# Patient Record
Sex: MALE | Race: WHITE | Employment: FULL TIME | URBAN - METROPOLITAN AREA
[De-identification: names, ages, dates, MRNs, and addresses within clinical notes are randomized per-mention and may not be internally consistent; named-entity substitution may affect disease eponyms.]

---

## 2017-01-19 ENCOUNTER — OFFICE VISIT (OUTPATIENT)
Dept: INTERNAL MEDICINE CLINIC | Age: 47
End: 2017-01-19

## 2017-01-19 VITALS
BODY MASS INDEX: 45.36 KG/M2 | HEART RATE: 79 BPM | TEMPERATURE: 98.3 F | RESPIRATION RATE: 16 BRPM | DIASTOLIC BLOOD PRESSURE: 85 MMHG | SYSTOLIC BLOOD PRESSURE: 127 MMHG | HEIGHT: 67 IN | WEIGHT: 289 LBS | OXYGEN SATURATION: 97 %

## 2017-01-19 DIAGNOSIS — E11.65 TYPE 2 DIABETES MELLITUS WITH HYPERGLYCEMIA, WITHOUT LONG-TERM CURRENT USE OF INSULIN (HCC): Primary | Chronic | ICD-10-CM

## 2017-01-19 DIAGNOSIS — E78.2 MIXED HYPERLIPIDEMIA: Chronic | ICD-10-CM

## 2017-01-19 RX ORDER — PRAVASTATIN SODIUM 20 MG/1
20 TABLET ORAL
Qty: 30 TAB | Refills: 6 | Status: SHIPPED | OUTPATIENT
Start: 2017-01-19

## 2017-01-19 RX ORDER — METFORMIN HYDROCHLORIDE 500 MG/1
500 TABLET ORAL 2 TIMES DAILY WITH MEALS
Qty: 120 TAB | Refills: 5 | Status: SHIPPED | OUTPATIENT
Start: 2017-01-19 | End: 2017-02-14 | Stop reason: SDUPTHER

## 2017-01-19 RX ORDER — METFORMIN HYDROCHLORIDE 500 MG/1
TABLET ORAL 2 TIMES DAILY WITH MEALS
COMMUNITY
End: 2017-01-19 | Stop reason: SDUPTHER

## 2017-01-19 NOTE — PATIENT INSTRUCTIONS
Diabetes Foot Health: Care Instructions  Your Care Instructions    When you have diabetes, your feet need extra care and attention. Diabetes can damage the nerve endings and blood vessels in your feet, making you less likely to notice when your feet are injured. Diabetes also limits your body's ability to fight infection and get blood to areas that need it. If you get a minor foot injury, it could become an ulcer or a serious infection. With good foot care, you can prevent most of these problems. Caring for your feet can be quick and easy. Most of the care can be done when you are bathing or getting ready for bed. Follow-up care is a key part of your treatment and safety. Be sure to make and go to all appointments, and call your doctor if you are having problems. Its also a good idea to know your test results and keep a list of the medicines you take. How can you care for yourself at home? · Keep your blood sugar close to normal by watching what and how much you eat, monitoring blood sugar, taking medicines if prescribed, and getting regular exercise. · Do not smoke. Smoking affects blood flow and can make foot problems worse. If you need help quitting, talk to your doctor about stop-smoking programs and medicines. These can increase your chances of quitting for good. · Eat a diet that is low in fats. High fat intake can cause fat to build up in your blood vessels and decrease blood flow. · Inspect your feet daily for blisters, cuts, cracks, or sores. If you cannot see well, use a mirror or have someone help you. · Take care of your feet:  [facility] [place] AUTHORITY your feet every day. Use warm (not hot) water. Check the water temperature with your wrists or other part of your body, not your feet. ¨ Dry your feet well. Pat them dry. Do not rub the skin on your feet too hard. Dry well between your toes. If the skin on your feet stays moist, bacteria or a fungus can grow, which can lead to infection.   ¨ Keep your skin soft. Use moisturizing skin cream to keep the skin on your feet soft and prevent calluses and cracks. But do not put the cream between your toes, and stop using any cream that causes a rash. ¨ Clean underneath your toenails carefully. Do not use a sharp object to clean underneath your toenails. Use the blunt end of a nail file or other rounded tool. ¨ Trim and file your toenails straight across to prevent ingrown toenails. Use a nail clipper, not scissors. Use an emery board to smooth the edges. · Change socks daily. Socks without seams are best, because seams often rub the feet. You can find socks for people with diabetes from specialty catalogs. · Look inside your shoes every day for things like gravel or torn linings, which could cause blisters or sores. · Buy shoes that fit well:  ¨ Look for shoes that have plenty of space around the toes. This helps prevent bunions and blisters. ¨ Try on shoes while wearing the kind of socks you will usually wear with the shoes. ¨ Avoid plastic shoes. They may rub your feet and cause blisters. Good shoes should be made of materials that are flexible and breathable, such as leather or cloth. ¨ Break in new shoes slowly by wearing them for no more than an hour a day for several days. Take extra time to check your feet for red areas, blisters, or other problems after you wear new shoes. · Do not go barefoot. Do not wear sandals, and do not wear shoes with very thin soles. Thin soles are easy to puncture. They also do not protect your feet from hot pavement or cold weather. · Have your doctor check your feet during each visit. If you have a foot problem, see your doctor. Do not try to treat an early foot problem at home. Home remedies or treatments that you can buy without a prescription (such as corn removers) can be harmful. · Always get early treatment for foot problems. A minor irritation can lead to a major problem if not properly cared for early.   When should you call for help? Call your doctor now or seek immediate medical care if:  · You have a foot sore, an ulcer or break in the skin that is not healing after 4 days, bleeding corns or calluses, or an ingrown toenail. · You have blue or black areas, which can mean bruising or blood flow problems. · You have peeling skin or tiny blisters between your toes or cracking or oozing of the skin. · You have a fever for more than 24 hours and a foot sore. · You have new numbness or tingling in your feet that does not go away after you move your feet or change positions. · You have unexplained or unusual swelling of the foot or ankle. Watch closely for changes in your health, and be sure to contact your doctor if:  · You cannot do proper foot care. Where can you learn more? Go to http://sussy-warren.info/. Enter A739 in the search box to learn more about \"Diabetes Foot Health: Care Instructions. \"  Current as of: May 23, 2016  Content Version: 11.1  © 7801-0897 [employer]. Care instructions adapted under license by [employer] (which disclaims liability or warranty for this information). If you have questions about a medical condition or this instruction, always ask your healthcare professional. [address] any warranty or liability for your use of this information. Come back to have hgba1c checked. Do NOT have to be fasting.

## 2017-01-19 NOTE — PROGRESS NOTES
Reviewed record in preparation for visit and have obtained necessary documentation. Identified pt with two pt identifiers(name and ). Chief Complaint   Patient presents with    Diabetes     follow up    Cholesterol Problem       Health Maintenance Due   Topic Date Due    FOOT EXAM Q1  1981    EYE EXAM RETINAL OR DILATED Q1  1981    Pneumococcal 19-64 Medium Risk (1 of 1 - PPSV23) 1990       Mr. Cathi Traylor has a reminder for a \"due or due soon\" health maintenance. I have asked that he discuss health maintenance topic(s) due with His  primary care provider. Coordination of Care Questionnaire:  :     1) Have you been to an emergency room, urgent care clinic since your last visit? no   Hospitalized since your last visit? no             2) Have you seen or consulted any other health care providers outside of [address] [place] [address] since your last visit? no  (Include any pap smears or colon screenings in this section.)      Patient is accompanied by self I have received verbal consent from David Mcrae to discuss any/all medical information while they are present in the room.

## 2017-01-19 NOTE — MR AVS SNAPSHOT
Visit Information Date & Time Provider Department Dept. Phone Encounter #  
 1/19/2017  4:00 PM Swetha Harris, [address] [phone] [id number] Follow-up Instructions Return in about 3 months (around 4/19/2017). Upcoming Health Maintenance Date Due  
 EYE EXAM RETINAL OR DILATED Q1 2/19/1981 Pneumococcal 19-64 Medium Risk (1 of 1 - PPSV23) 2/19/1990 HEMOGLOBIN A1C Q6M 3/18/2017 MICROALBUMIN Q1 10/27/2017 LIPID PANEL Q1 10/27/2017 FOOT EXAM Q1 1/19/2018 DTaP/Tdap/Td series (2 - Td) 10/18/2026 Allergies as of 1/19/2017  Review Complete On: 1/19/2017 By: John Mathews III, DO No Known Allergies Current Immunizations  Reviewed on 10/18/2016 Name Date Tdap 10/18/2016  4:26 PM  
  
 Not reviewed this visit You Were Diagnosed With   
  
 Codes Comments Type 2 diabetes mellitus with hyperglycemia, without long-term current use of insulin (HCC)    -  Primary ICD-10-CM: E11.65 ICD-9-CM: 250.00, 790.29 Mixed hyperlipidemia     ICD-10-CM: E78.2 ICD-9-CM: 272.2 Vitals BP Pulse Temp Resp Height(growth percentile) Weight(growth percentile) 127/85 (BP 1 Location: Right arm, BP Patient Position: Sitting) 79 98.3 °F (36.8 °C) (Oral) 16 5' 7\" (1.702 m) 289 lb (131.1 kg) SpO2 BMI Smoking Status 97% 45.26 kg/m2 Current Every Day Smoker Vitals History BMI and BSA Data Body Mass Index Body Surface Area  
 45.26 kg/m 2 2.49 m 2 Preferred Pharmacy Pharmacy Name Phone [phone] [facility] [phone] Your Updated Medication List  
  
   
This list is accurate as of: 1/19/17  5:13 PM.  Always use your most recent med list.  
  
  
  
  
 ibuprofen 600 mg tablet Commonly known as:  MOTRIN Take 1 Tab by mouth three (3) times daily (with meals). metFORMIN 500 mg tablet Commonly known as:  GLUCOPHAGE Take 1 Tab by mouth two (2) times daily (with meals). Indications: takes 2 tablets twice daily  
  
 pravastatin 20 mg tablet Commonly known as:  PRAVACHOL Take 1 Tab by mouth nightly. Prescriptions Sent to Pharmacy Refills  
 metFORMIN (GLUCOPHAGE) 500 mg tablet 5 Sig: Take 1 Tab by mouth two (2) times daily (with meals). Indications: takes 2 tablets twice daily Class: Normal  
 Pharmacy: [address] Ph #: [phone] Route: Oral  
 pravastatin (PRAVACHOL) 20 mg tablet 6 Sig: Take 1 Tab by mouth nightly. Class: Normal  
 Pharmacy: [address] Ph #: [phone] Route: Oral  
  
We Performed the Following HEMOGLOBIN A1C WITH EAG [29211 CPT(R)] Follow-up Instructions Return in about 3 months (around 4/19/2017). Patient Instructions Diabetes Foot Health: Care Instructions Your Care Instructions When you have diabetes, your feet need extra care and attention. Diabetes can damage the nerve endings and blood vessels in your feet, making you less likely to notice when your feet are injured. Diabetes also limits your body's ability to fight infection and get blood to areas that need it. If you get a minor foot injury, it could become an ulcer or a serious infection. With good foot care, you can prevent most of these problems. Caring for your feet can be quick and easy. Most of the care can be done when you are bathing or getting ready for bed. Follow-up care is a key part of your treatment and safety. Be sure to make and go to all appointments, and call your doctor if you are having problems. Its also a good idea to know your test results and keep a list of the medicines you take. How can you care for yourself at home? · Keep your blood sugar close to normal by watching what and how much you eat, monitoring blood sugar, taking medicines if prescribed, and getting regular exercise. · Do not smoke. Smoking affects blood flow and can make foot problems worse. If you need help quitting, talk to your doctor about stop-smoking programs and medicines. These can increase your chances of quitting for good. · Eat a diet that is low in fats. High fat intake can cause fat to build up in your blood vessels and decrease blood flow. · Inspect your feet daily for blisters, cuts, cracks, or sores. If you cannot see well, use a mirror or have someone help you. · Take care of your feet: 
[facility] AUTHORITY your feet every day. Use warm (not hot) water. Check the water temperature with your wrists or other part of your body, not your feet. ¨ Dry your feet well. Pat them dry. Do not rub the skin on your feet too hard. Dry well between your toes. If the skin on your feet stays moist, bacteria or a fungus can grow, which can lead to infection. ¨ Keep your skin soft. Use moisturizing skin cream to keep the skin on your feet soft and prevent calluses and cracks. But do not put the cream between your toes, and stop using any cream that causes a rash. ¨ Clean underneath your toenails carefully. Do not use a sharp object to clean underneath your toenails. Use the blunt end of a nail file or other rounded tool. ¨ Trim and file your toenails straight across to prevent ingrown toenails. Use a nail clipper, not scissors. Use an emery board to smooth the edges. · Change socks daily. Socks without seams are best, because seams often rub the feet. You can find socks for people with diabetes from specialty catalogs. · Look inside your shoes every day for things like gravel or torn linings, which could cause blisters or sores. · Buy shoes that fit well: 
¨ Look for shoes that have plenty of space around the toes. This helps prevent bunions and blisters. ¨ Try on shoes while wearing the kind of socks you will usually wear with the shoes. ¨ Avoid plastic shoes. They may rub your feet and cause blisters. Good shoes should be made of materials that are flexible and breathable, such as leather or cloth. ¨ Break in new shoes slowly by wearing them for no more than an hour a day for several days. Take extra time to check your feet for red areas, blisters, or other problems after you wear new shoes. · Do not go barefoot. Do not wear sandals, and do not wear shoes with very thin soles. Thin soles are easy to puncture. They also do not protect your feet from hot pavement or cold weather. · Have your doctor check your feet during each visit. If you have a foot problem, see your doctor. Do not try to treat an early foot problem at home. Home remedies or treatments that you can buy without a prescription (such as corn removers) can be harmful. · Always get early treatment for foot problems. A minor irritation can lead to a major problem if not properly cared for early. When should you call for help? Call your doctor now or seek immediate medical care if: 
· You have a foot sore, an ulcer or break in the skin that is not healing after 4 days, bleeding corns or calluses, or an ingrown toenail. · You have blue or black areas, which can mean bruising or blood flow problems. · You have peeling skin or tiny blisters between your toes or cracking or oozing of the skin. · You have a fever for more than 24 hours and a foot sore. · You have new numbness or tingling in your feet that does not go away after you move your feet or change positions. · You have unexplained or unusual swelling of the foot or ankle. Watch closely for changes in your health, and be sure to contact your doctor if: 
· You cannot do proper foot care. Where can you learn more? Go to http://sussy-warren.info/.  
Enter A748 in the search box to learn more about \"Diabetes Foot Health: Care Instructions. \" Current as of: May 23, 2016 Content Version: 11.1 © 7995-7835 [employer]. Care instructions adapted under license by [employer] (which disclaims liability or warranty for this information). If you have questions about a medical condition or this instruction, always ask your healthcare professional. [address] any warranty or liability for your use of this information. Come back to have hgba1c checked. Do NOT have to be fasting. Introducing [facility]! Dee Foley introduces [employer] patient portal. Now you can access parts of your medical record, email your doctor's office, and request medication refills online. 1. In your internet browser, go to [email or web address]. [employer] for Google [email or web address]/[employer] 2. Click on the First Time User? Click Here link in the Sign In box. You will see the New Member Sign Up page. 3. Enter your [employer] Access Code exactly as it appears below. You will not need to use this code after youve completed the sign-up process. If you do not sign up before the expiration date, you must request a new code. · [employer] Access Code: [email or web address] Expires: 4/19/2017  5:13 PM 
 
4. Enter the last four digits of your Social Security Number (xxxx) and Date of Birth (mm/dd/yyyy) as indicated and click Submit. You will be taken to the next sign-up page. 5. Create a [employer] ID. This will be your [employer] login ID and cannot be changed, so think of one that is secure and easy to remember. 6. Create a [employer] password. You can change your password at any time. 7. Enter your Password Reset Question and Answer. This can be used at a later time if you forget your password. 8. Enter your e-mail address. You will receive e-mail notification when new information is available in [address]. 9. Click Sign Up. You can now view and download portions of your medical record. 10. Click the Download Summary menu link to download a portable copy of your medical information. If you have questions, please visit the Frequently Asked Questions section of the [employer] website. Remember, [employer] is NOT to be used for urgent needs. For medical emergencies, dial 911. Now available from your iPhone and Android! Please provide this summary of care documentation to your next provider. Your primary care clinician is listed as Andrew Solorzano If you have any questions after today's visit, please call [phone].

## 2017-01-19 NOTE — PROGRESS NOTES
Darlin Kapoor is a 40 y.o. male who presents for evaluation of routine follow up. Last seen by me oct 18. Doing ok since then, though he has gained 13 lbs. Did not tolerate lipitor, caused diffuse myalgias that resolved about a week after stopping it. ROS:  Constitutional: negative for fevers, chills, anorexia and weight loss  Eyes:   negative for visual disturbance and irritation  ENT:   negative for tinnitus,sore throat,nasal congestion,ear pain,hoarseness  Respiratory:  negative for cough, hemoptysis, dyspnea,wheezing  CV:   negative for chest pain, palpitations, lower extremity edema  GI:   negative for nausea, vomiting, diarrhea, abdominal pain,melena  Genitourinary: negative for frequency, dysuria and hematuria  Musculoskel: negative for myalgias, arthralgias, back pain, muscle weakness, joint pain  Neurological:  negative for headaches, dizziness, focal weakness, numbness  Psychiatric:     Negative for depression or anxiety      Past Medical History   Diagnosis Date    Diabetes ([facility] [address]      on metformin for 6 months    Excessive daytime sleepiness      probable sleep apnea    H/O abscess of skin and subcutaneous tissue     Obesity     Psoriasis        Past Surgical History   Procedure Laterality Date    Hx cyst incision and drainage Left 09/14/2016     left perineal abscess    Hx other surgical       I & D of perineal abscess 9/14/16       Family History   Problem Relation Age of Onset    Cancer Mother      lung    No Known Problems Father     Diabetes Maternal Grandmother     Heart Disease Maternal Grandfather        Social History     Social History    Marital status:      Spouse name: N/A    Number of children: N/A    Years of education: N/A     Occupational History    Not on file.      Social History Main Topics    Smoking status: Current Every Day Smoker     Packs/day: 0.75     Years: 30.00     Types: Cigarettes    Smokeless tobacco: Never Used    Alcohol use No    Drug use: No    Sexual activity: Yes     Partners: Female     Birth control/ protection: None     Other Topics Concern    Not on file     Social History Narrative            Visit Vitals    /85 (BP 1 Location: Right arm, BP Patient Position: Sitting)    Pulse 79    Temp 98.3 °F (36.8 °C) (Oral)    Resp 16    Ht 5' 7\" (1.702 m)    Wt 289 lb (131.1 kg)    SpO2 97%    BMI 45.26 kg/m2       Physical Examination:   General - Well appearing male  HEENT - PERRL, TM no erythema/opacification, normal nasal turbinates, no oropharyngeal erythema or exudate, MMM  Neck - supple, no bruits, no thyroidomegaly, no lymphadenopathy  Pulm - clear to auscultation bilaterally  Cardio - RRR, normal S1 S2, no murmur  Abd - soft, nontender, no masses, no HSM  Extrem - no edema, +2 distal pulses  Neuro-  No focal deficits, CN intact           Diabetic foot exam performed by Mike Venegas III, DO     Measurement  Response Nurse Comment Physician Comment   Monofilament  R - normal sensation with micro filament  L - normal sensation with micro filament     Pulse DP R - present  L - present     Pulse TP R - present  L - present     Structural deformity R - None  L - None     Skin Integrity / Deformity R - None  L - None        Reviewed by:              Assessment/Plan:    1.  Dm, type 2--check a1c, continue with glucophage  2.  hyperlipids--did not tolerate lipitor, try pravachol  3. Morbid obesity--working on weight loss  4. Routine adult health maintenance--declined pneumovax today. Will make appt for eyes.     rtc 3 months        Mike Venegas III, DO

## 2017-01-19 NOTE — LETTER
1/30/2017 2:33 PM 
 
Mr. Graciela Rosales [facility] [place] [address] [id number] Dear Graciela Rosales: Please find your most recent results below. Resulted Orders HEMOGLOBIN A1C WITH EAG Result Value Ref Range Hemoglobin A1c 8.4 (H) 4.8 - 5.6 % Comment:  
            Pre-diabetes: 5.7 - 6.4 Diabetes: >6.4 Glycemic control for adults with diabetes: <7.0 Estimated average glucose 194 mg/dL Narrative Performed at:  [address]  [id number] : Bee Fisher MD, Phone:  [phone] RECOMMENDATIONS: 
Making progress, diabetes test (a1c) improved to 8.4.  Continue working on cutting back on carbs/starches and taking glucophage. Please call me if you have any questions: [phone] Sincerely, Ben Watts III, DO

## 2017-01-26 ENCOUNTER — APPOINTMENT (OUTPATIENT)
Dept: INTERNAL MEDICINE CLINIC | Age: 47
End: 2017-01-26

## 2017-01-27 LAB
EST. AVERAGE GLUCOSE BLD GHB EST-MCNC: 194 MG/DL
HBA1C MFR BLD: 8.4 % (ref 4.8–5.6)

## 2017-01-27 NOTE — PROGRESS NOTES
Making progress, diabetes test (a1c) improved to 8.4. Continue working on cutting back on carbs/starches and taking glucophage.

## 2017-01-30 ENCOUNTER — TELEPHONE (OUTPATIENT)
Dept: INTERNAL MEDICINE CLINIC | Age: 47
End: 2017-01-30

## 2017-01-30 NOTE — PROGRESS NOTES
I attempted to contact patient. Pt did not answer. Unable to leave . Patient's results have been mailed to patient's home.

## 2017-02-14 RX ORDER — METFORMIN HYDROCHLORIDE 500 MG/1
500 TABLET ORAL 2 TIMES DAILY WITH MEALS
Qty: 180 TAB | Refills: 3 | Status: SHIPPED | OUTPATIENT
Start: 2017-02-14

## 2017-04-09 ENCOUNTER — DOCUMENTATION ONLY (OUTPATIENT)
Dept: OPHTHALMOLOGY | Age: 47
End: 2017-04-09

## 2017-04-09 DIAGNOSIS — I65.29 STENOSIS OF CAROTID ARTERY, UNSPECIFIED LATERALITY: Primary | ICD-10-CM

## 2017-04-09 NOTE — PROGRESS NOTES
Dear Dr. Quang Henderson,  Thank you for your kind referral of Mr. Roxie Junior for his diabetic eye exam.  He had peripheral retinal hemorrhages which can be a sign of ocular ischemia and carotid stenosis. He notes he does not take his cholesterol medication as his legs hurt. If appropriate, I would recommend a carotid doppler for his patient. This could also be due to diabetes however he had no central retinal hemorrhages. Please find my notes below for your review. Thank you for allowing me to share in the care of your patient. Sincerely,  Lashonda Hernandez MD    Outpatient Ophthalmology Note    Date of Service: April 7, 2017    Facility: [facility] [place], [facility]'[address], [address] [place] [address] Way  Phone: [phone]; Fax: [phone]    Ophthalmologist: Lashonda Hernandez MD    SUBJECTIVE    CC: \"Diabetic eye exam/Refraction\"    HPI: Deborah Linder is a 56 yr old , Male who presents for a diabetic eye exam and refraction as referred by Dr. Trinity Courtney. Cannot recall last HgA1c Level. Fasting Blooding Glucose Level: Unsure last checked approximately 2 months ago. Has noticed within the past year fluctuations in his vision which correlate with his sugar levels. Noticebly more often in the morning times when blood glucose level is elevated. Denies any pain, itching, burning, or dryness. Patient admits to frequent eye rubbing. Last Glasses Rx approximately 89 years old, Last eye exam 3 years ago.     Eye Review of Systems:   Flashing Lights - Negative  Floaters - Positive   Eye Pain - Negative  History of eye surgery - Negative  Loss of vision - Negative  Diabetes - Positive Dx 2016  Glaucoma - Family Hx    Past Medical Hx:   hyperlipidemia  Diabetes Dx: 2016-Oral Medication/Diet Controlled  Hearing Loss    Past Surgical Hx:   None    Medications:   MetFORMIN HCl 1000 MG Oral Tablet    Allergies:   NKDA    Social Hx:   55 y/o [place] Male  , Employed in [place] [employer] at [employer]  Tobacco Use: 1/2 ppd x 27 yrs  ETOH use: 1 drink wkly  No illicit drug use    Family Hx:   Maternal Aunts: Hertzsprung disease  Maternal/Paternal: Diabetes  Maternal Grandmother: Glaucoma     OBJECTIVE    Visual Acuity:  Distance (best corrected)  OD 20/25+1, pinhole 20/20  OS 20/20    Wearing Rx:  OD -3.00 +2.50 x013  Add: +1.00  OS -3.50 +2.25 x002  Add: +1.00    Manifest Refraction:  OD -3.50 +2.00 x 015  =20/20        Add: +2.00  =20/20  OS  -3.50 +2.50 x 002  =20/20-1    Add: +2.00  =20/20  Progressive Add: +2.25    Confrontation Visual Fields  OD: Full to count fingers   OS: Full to count fingers     Motility:   OD - full in all fields of gaze  OS - full in all fields of gaze    Pupils:   OD: 4 mm in dark to 2 mm; no rAPD  OS: 4 mm in dark to 2 mm; no rAPD    IOP (Ta): OD 15 mmHg,                    OS 14 mmHg  at 1:57 pm      Gonioscopy:  OD: open to  deg  OS: open to  deg     Slit Lamp Exam:  External: Normal. No ptosis, no rash, no facial droop  Lids/lashes: Demodex blepharitis  Conjunctiva: Clear OU  Cornea: Clear OU  Anterior Segment: Clear OU  Iris: normal OU  Lens: Clear OU  Anterior Vitreous: Clear OU. Dilated Fundus Exam:   Dilating Drops (M) instilled at 2:00 pm     Vitreous:   OD: clear  OS: clear    Optic nerve:   OD - c/d 0.2, healthy, pink, no disc edema  OS - c/d 0.2, healthy, pink, no disc edema    Macula:  OD - normal fovea, no drusen, no macular edema, no hemorrhages  OS  - normal fovea, no drusen, no macular edema, no hemorrhages    Vessels:   OD: normal A/V ratio  OS: normal A/V ratio    Periphery:    OD: No retinal detachment/holes/tears, 1 Large dot-blot hemorrhage at 9 o'clock, small hemorrhage at 4-5 o'clock  OS: No retinal detachment/holes/tears, 1 small hemorrhage at 5 o'clock    ASSESSMENT    1. Diabetes mellitus (dx 2016) - NIDDM   - peripheral retinal hemorrhages both eyes, possibly diabetic retinopathy or sign of ocular ischemia.   - no macular edema  - HbA1c = 8.4 (1/19/2017)    2. Demodex Blepharitis  - 1 Cliridex pad sample provided to patient  - offered antibiotic ointment to help smother mites to use at bedtime- pt declines at this time  - discussed blephex treatment    3. High astigmatism  - recommend avoiding eye rubbing  - no keratoconus on topography    4. Refractive error  - dispense new glasses Rx as requested    Diagnoses attached to this encounter:  (Q28.1250) Type 2 diabetes mellitus with mild nonproliferative diabetic retinopathy without macular edema, bilateral  (H35.63) Retinal hemorrhage, bilateral  (H01.004) Unspecified blepharitis left upper eyelid  (H52.203) Unspecified astigmatism, bilateral    PLAN    Demodex blepharitis    Signs and Symptoms: eyelid itching, crusting, boils on eyelids, red bumps on skin, dry eyes, burning eyes, conjunctivitis, eye irritation, intermittent blurred vision     Treatment Protocol:  1) Warm compresses 10 min 2x per day  2) Wash faces and eyelids with face wash  3) Apply tea tree oil treatment either with Cliradex lid wipes (approved for use on eye) and let air dry and do not rinse off OR shampoo (diluted 50% with water) - gentle scrub to eyelashes for 1 minute then rinse off. Perform this 2x per day for 6 weeks for full treatment  4) If not improved, consider Soolantra (ivermectin/prescription) anti-mite cream or Avenova (prescription) spray to help. Apply small amount to eyelid (do NOT get into eyes) and other affected areas 1x per day for 10-20 days. We will need to get this approved thru your insurance. 5) Other symptomatic control with tears as needed - no more than 4x per day    Diabetes mellitus  - discussed eye disease with uncontrolled diabetes including swelling of the retina (macular edema), bleeding and cholesterol deposits (mild, moderate or severe) and new blood vessel growth in the retina (proliferative changes) as well as risk of cataracts and glaucoma. - blood sugar control and monitoring. Follow up in 6 months to recheck hemorrhages. Recommend patient take cholesterol medications (notes he was high and didn't take meds as his legs hurt) and consider getting carotid doppler (notes he had this recently but unable to find this in EPIC).

## 2018-06-18 ENCOUNTER — OFFICE VISIT (OUTPATIENT)
Dept: INTERNAL MEDICINE CLINIC | Age: 48
End: 2018-06-18

## 2018-06-18 VITALS
HEIGHT: 67 IN | SYSTOLIC BLOOD PRESSURE: 142 MMHG | BODY MASS INDEX: 44.26 KG/M2 | HEART RATE: 99 BPM | DIASTOLIC BLOOD PRESSURE: 85 MMHG | OXYGEN SATURATION: 98 % | WEIGHT: 282 LBS | TEMPERATURE: 98.1 F

## 2018-06-18 DIAGNOSIS — N40.1 BENIGN PROSTATIC HYPERPLASIA WITH WEAK URINARY STREAM: Chronic | ICD-10-CM

## 2018-06-18 DIAGNOSIS — E66.01 OBESITY, MORBID (HCC): ICD-10-CM

## 2018-06-18 DIAGNOSIS — I10 ESSENTIAL HYPERTENSION: Chronic | ICD-10-CM

## 2018-06-18 DIAGNOSIS — E11.65 TYPE 2 DIABETES MELLITUS WITH HYPERGLYCEMIA, WITHOUT LONG-TERM CURRENT USE OF INSULIN (HCC): Chronic | ICD-10-CM

## 2018-06-18 DIAGNOSIS — R39.12 BENIGN PROSTATIC HYPERPLASIA WITH WEAK URINARY STREAM: Chronic | ICD-10-CM

## 2018-06-18 DIAGNOSIS — E11.69 HYPERLIPIDEMIA ASSOCIATED WITH TYPE 2 DIABETES MELLITUS (HCC): Chronic | ICD-10-CM

## 2018-06-18 DIAGNOSIS — R00.2 PALPITATIONS: Primary | ICD-10-CM

## 2018-06-18 DIAGNOSIS — E78.5 HYPERLIPIDEMIA ASSOCIATED WITH TYPE 2 DIABETES MELLITUS (HCC): Chronic | ICD-10-CM

## 2018-06-18 PROBLEM — E78.2 MIXED HYPERLIPIDEMIA: Chronic | Status: RESOLVED | Noted: 2017-01-19 | Resolved: 2018-06-18

## 2018-06-18 RX ORDER — ASPIRIN 81 MG/1
81 TABLET ORAL DAILY
Qty: 90 TAB | Refills: 3
Start: 2018-06-18

## 2018-06-18 NOTE — PROGRESS NOTES
Yanet Pierre is a 53 y.o. male who presents for evaluation of palpitations. Last seen by me jan 19, 2017. Palpitations have been on/off now for about 5 months, but increased in frequency and duration of late. Some occurs with activity, other times when he is on computer playing video games. Denies any chest pain, pressure or other symptoms. Admits to drinking lots of coffee each morning, as well as having a stressful job, works at [employer].       ROS:  Constitutional: negative for fevers, chills, anorexia and weight loss  Eyes:   negative for visual disturbance and irritation  ENT:   negative for tinnitus,sore throat,nasal congestion,ear pain,hoarseness  Respiratory:  negative for cough, hemoptysis, dyspnea,wheezing  CV:   negative for chest pain, palpitations, lower extremity edema  GI:   negative for nausea, vomiting, diarrhea, abdominal pain,melena  Genitourinary: negative for frequency, dysuria and hematuria  Musculoskel: negative for myalgias, arthralgias, back pain, muscle weakness, joint pain  Neurological:  negative for headaches, dizziness, focal weakness, numbness  Psychiatric:     Negative for depression or anxiety      Past Medical History:   Diagnosis Date    Diabetes ([facility] [address]     on metformin for 6 months    Excessive daytime sleepiness     probable sleep apnea    H/O abscess of skin and subcutaneous tissue     Obesity     Psoriasis        Past Surgical History:   Procedure Laterality Date    HX CYST INCISION AND DRAINAGE Left 09/14/2016    left perineal abscess    HX OTHER SURGICAL      I & D of perineal abscess 9/14/16       Family History   Problem Relation Age of Onset    Cancer Mother      lung    No Known Problems Father     Diabetes Maternal Grandmother     Heart Disease Maternal Grandfather        Social History     Social History    Marital status:      Spouse name: N/A    Number of children: N/A    Years of education: N/A     Occupational History  Not on file. Social History Main Topics    Smoking status: Current Every Day Smoker     Packs/day: 0.75     Years: 30.00     Types: Cigarettes    Smokeless tobacco: Never Used    Alcohol use No    Drug use: Yes     Special: Prescription, OTC    Sexual activity: Yes     Partners: Female     Birth control/ protection: None     Other Topics Concern    Not on file     Social History Narrative            Visit Vitals    /85 (BP 1 Location: Left arm, BP Patient Position: Sitting)    Pulse 99    Temp 98.1 °F (36.7 °C) (Oral)    Ht 5' 7\" (1.702 m)    Wt 282 lb (127.9 kg)    SpO2 98%    BMI 44.17 kg/m2       Physical Examination:   General - Well appearing male  HEENT - PERRL, TM no erythema/opacification, normal nasal turbinates, no oropharyngeal erythema or exudate, MMM  Neck - supple, no bruits, no thyroidomegaly, no lymphadenopathy  Pulm - clear to auscultation bilaterally  Cardio - RRR, normal S1 S2, no murmur  Abd - soft, nontender, no masses, no HSM  Extrem - no edema, +2 distal pulses  Neuro-  No focal deficits, CN intact     Assessment/Plan:    1. Heart palpitations--ecg with nsr today. Check tsh, cbc, cmp. Referral to cardio, dr Jody Mitchell for possible holter monitor. Also advised to cut back on caffeine intake. Suggested starting 81 mg asa daily. 2.  Dm, type 2--last a1c 8.4, on glucophage. Check a1c, urine micro  3. Tobacco abuse--not ready to quit at this time  4. Elevated bp--not currently on any meds  5.  hyperlipids--last , stopped taking his pravachol.     rtc 3 months        Noe Tapia III, DO

## 2018-06-18 NOTE — MR AVS SNAPSHOT
[address] [facility] [address]. 
[phone] Patient: Teo Hassan MRN: [id number] RER:[birth year] Visit Information Date & Time Provider Department Dept. Phone Encounter #  
 6/18/2018  2:15 PM Mikaylasubha Escobedo, [address] [facility] [address]  [id number] Follow-up Instructions Return in about 3 months (around 9/18/2018). Upcoming Health Maintenance Date Due  
 EYE EXAM RETINAL OR DILATED Q1 2/19/1981 HEMOGLOBIN A1C Q6M 7/26/2017 MICROALBUMIN Q1 10/27/2017 LIPID PANEL Q1 10/27/2017 FOOT EXAM Q1 1/19/2018 Influenza Age 5 to Adult 8/1/2018 DTaP/Tdap/Td series (2 - Td) 10/18/2026 Allergies as of 6/18/2018  Review Complete On: 6/18/2018 By: Adan Barrera III, DO Severity Noted Reaction Type Reactions Lipitor [Atorvastatin]  01/19/2017   Side Effect Myalgia  
 myalgias Current Immunizations  Reviewed on 10/18/2016 Name Date Tdap 10/18/2016  4:26 PM  
  
 Not reviewed this visit You Were Diagnosed With   
  
 Codes Comments Palpitations    -  Primary ICD-10-CM: R00.2 ICD-9-CM: 785.1 Obesity, morbid ([facility]     ICD-10-CM: E66.01 
ICD-9-CM: 278.01 Essential hypertension     ICD-10-CM: I10 
ICD-9-CM: 401.9 Hyperlipidemia associated with type 2 diabetes mellitus ([facility]     ICD-10-CM: E11.69, E78.5 ICD-9-CM: 250.80, 272.4 Type 2 diabetes mellitus with hyperglycemia, without long-term current use of insulin (HCC)     ICD-10-CM: E11.65 ICD-9-CM: 250.00, 790.29 Benign prostatic hyperplasia with weak urinary stream     ICD-10-CM: N40.1, R39.12 
ICD-9-CM: 600.01, 788.62 Vitals BP Pulse Temp Height(growth percentile) Weight(growth percentile) SpO2  
 142/85 (BP 1 Location: Left arm, BP Patient Position: Sitting) 99 98.1 °F (36.7 °C) (Oral) 5' 7\" (1.702 m) 282 lb (127.9 kg) 98% BMI Smoking Status 44.17 kg/m2 Current Every Day Smoker BMI and BSA Data Body Mass Index Body Surface Area  
 44.17 kg/m 2 2.46 m 2 Preferred Pharmacy Pharmacy Name Phone [id number] [phone] Your Updated Medication List  
  
   
This list is accurate as of 6/18/18  3:50 PM.  Always use your most recent med list.  
  
  
  
  
 aspirin delayed-release 81 mg tablet Take 1 Tab by mouth daily. ibuprofen 600 mg tablet Commonly known as:  MOTRIN Take 1 Tab by mouth three (3) times daily (with meals). metFORMIN 500 mg tablet Commonly known as:  GLUCOPHAGE Take 1 Tab by mouth two (2) times daily (with meals). Indications: takes 2 tablets twice daily  
  
 pravastatin 20 mg tablet Commonly known as:  PRAVACHOL Take 1 Tab by mouth nightly. We Performed the Following AMB POC EKG ROUTINE W/ 12 LEADS, INTER & REP [68324 CPT(R)] CBC WITH AUTOMATED DIFF [03851 CPT(R)] HEMOGLOBIN A1C WITH EAG [14453 CPT(R)] LIPID PANEL [91210 CPT(R)] METABOLIC PANEL, COMPREHENSIVE [96274 CPT(R)] MICROALBUMIN, UR, RAND W/ MICROALB/CREAT RATIO [id number] CPT(R)] PSA, DIAGNOSTIC (PROSTATE SPECIFIC AG) V627701 CPT(R)] REFERRAL TO CARDIOLOGY [SXV11 Custom] TSH 3RD GENERATION [55076 CPT(R)] Follow-up Instructions Return in about 3 months (around 9/18/2018). Referral Information Referral ID Referred By Referred To  
  
 [id number] Jazmyn Porter , Dali Berman MD   
   [address] [place] [address], [facility] Phone: [phone] Fax: [phone] Visits Status Start Date End Date 1 New Request 6/18/18 6/18/19 If your referral has a status of pending review or denied, additional information will be sent to support the outcome of this decision. Patient Instructions Palpitations: Care Instructions Your Care Instructions Heart palpitations are the uncomfortable sensation that your heart is beating fast or irregularly. You might feel pounding or fluttering in your chest. It might feel like your heart is skipping a beat. Although palpitations may be caused by a heart problem, they also occur because of stress, fatigue, or use of alcohol, caffeine, or nicotine. Many medicines, including diet pills, antihistamines, decongestants, and some herbal products, can cause heart palpitations. Nearly everyone has palpitations from time to time. Depending on your symptoms, your doctor may need to do more tests to try to find the cause of your palpitations. Follow-up care is a key part of your treatment and safety. Be sure to make and go to all appointments, and call your doctor if you are having problems. It's also a good idea to know your test results and keep a list of the medicines you take. How can you care for yourself at home? · Avoid caffeine, nicotine, and excess alcohol. · Do not take illegal drugs, such as methamphetamines and cocaine. · Do not take weight loss or diet medicines unless you talk with your doctor first. 
· Get plenty of sleep. · Do not overeat. · If you have palpitations again, take deep breaths and try to relax. This may slow a racing heart. · If you start to feel lightheaded, lie down to avoid injuries that might result if you pass out and fall down. · Keep a record of your palpitations and bring it to your next doctor's appointment. Write down: ¨ The date and time. ¨ Your pulse. (If your heart is beating fast, it may be hard to count your pulse.) ¨ What you were doing when the palpitations started. ¨ How long the palpitations lasted. ¨ Any other symptoms. · If an activity causes palpitations, slow down or stop. Talk to your doctor before you do that activity again. · Take your medicines exactly as prescribed.  Call your doctor if you think you are having a problem with your medicine. When should you call for help? Call 911 anytime you think you may need emergency care. For example, call if: 
? · You passed out (lost consciousness). ? · You have symptoms of a heart attack. These may include: ¨ Chest pain or pressure, or a strange feeling in the chest. 
¨ Sweating. ¨ Shortness of breath. ¨ Pain, pressure, or a strange feeling in the back, neck, jaw, or upper belly or in one or both shoulders or arms. ¨ Lightheadedness or sudden weakness. ¨ A fast or irregular heartbeat. After you call 911, the  may tell you to chew 1 adult-strength or 2 to 4 low-dose aspirin. Wait for an ambulance. Do not try to drive yourself. ? · You have symptoms of a stroke. These may include: 
¨ Sudden numbness, tingling, weakness, or loss of movement in your face, arm, or leg, especially on only one side of your body. ¨ Sudden vision changes. ¨ Sudden trouble speaking. ¨ Sudden confusion or trouble understanding simple statements. ¨ Sudden problems with walking or balance. ¨ A sudden, severe headache that is different from past headaches. ?Call your doctor now or seek immediate medical care if: 
? · You have heart palpitations and: ¨ Are dizzy or lightheaded, or you feel like you may faint. ¨ Have new or increased shortness of breath. ? Watch closely for changes in your health, and be sure to contact your doctor if: 
? · You continue to have heart palpitations. Where can you learn more? Go to http://sussy-warren.info/. Enter R508 in the search box to learn more about \"Palpitations: Care Instructions. \" Current as of: September 21, 2016 Content Version: 11.4 © 6927-0070 [employer]. Care instructions adapted under license by [employer] (which disclaims liability or warranty for this information).  If you have questions about a medical condition or this instruction, always ask your healthcare professional. [address] any warranty or liability for your use of this information. Come back for fasting labs, lab opens 8 am, mon-fri. No appt needed. Work on cutting back on amount of caffeine that you drink. Start 81 mg aspirin each morning with breakfast. 
 
 
  
Introducing [facility]! Alejandra Winn introduces Copious patient portal. Now you can access parts of your medical record, email your doctor's office, and request medication refills online. 1. In your internet browser, go to https://Local [facility]. [employer]/Local [facility] 2. Click on the First Time User? Click Here link in the Sign In box. You will see the New Member Sign Up page. 3. Enter your Copious Access Code exactly as it appears below. You will not need to use this code after youve completed the sign-up process. If you do not sign up before the expiration date, you must request a new code. · Copious Access Code: [email or web address] Expires: 9/16/2018  3:50 PM 
 
4. Enter the last four digits of your Social Security Number (xxxx) and Date of Birth (mm/dd/yyyy) as indicated and click Submit. You will be taken to the next sign-up page. 5. Create a Copious ID. This will be your Copious login ID and cannot be changed, so think of one that is secure and easy to remember. 6. Create a Copious password. You can change your password at any time. 7. Enter your Password Reset Question and Answer. This can be used at a later time if you forget your password. 8. Enter your e-mail address. You will receive e-mail notification when new information is available in [address] 9. Click Sign Up. You can now view and download portions of your medical record. 10. Click the Download Summary menu link to download a portable copy of your medical information.  
 
If you have questions, please visit the Frequently Asked Questions section of the [employer]. Remember, [employer] is NOT to be used for urgent needs. For medical emergencies, dial 911. Now available from your iPhone and Android! Please provide this summary of care documentation to your next provider. Your primary care clinician is listed as Tracy Chan If you have any questions after today's visit, please call [phone].

## 2018-06-18 NOTE — PROGRESS NOTES
Chief Complaint   Patient presents with    Headache     2-3 months    Blurred Vision     2-3 months     Palpitations     happens after emotional upsets(mad) also with activity

## 2018-06-18 NOTE — PATIENT INSTRUCTIONS
Palpitations: Care Instructions  Your Care Instructions    Heart palpitations are the uncomfortable sensation that your heart is beating fast or irregularly. You might feel pounding or fluttering in your chest. It might feel like your heart is skipping a beat. Although palpitations may be caused by a heart problem, they also occur because of stress, fatigue, or use of alcohol, caffeine, or nicotine. Many medicines, including diet pills, antihistamines, decongestants, and some herbal products, can cause heart palpitations. Nearly everyone has palpitations from time to time. Depending on your symptoms, your doctor may need to do more tests to try to find the cause of your palpitations. Follow-up care is a key part of your treatment and safety. Be sure to make and go to all appointments, and call your doctor if you are having problems. It's also a good idea to know your test results and keep a list of the medicines you take. How can you care for yourself at home? · Avoid caffeine, nicotine, and excess alcohol. · Do not take illegal drugs, such as methamphetamines and cocaine. · Do not take weight loss or diet medicines unless you talk with your doctor first.  · Get plenty of sleep. · Do not overeat. · If you have palpitations again, take deep breaths and try to relax. This may slow a racing heart. · If you start to feel lightheaded, lie down to avoid injuries that might result if you pass out and fall down. · Keep a record of your palpitations and bring it to your next doctor's appointment. Write down:  ¨ The date and time. ¨ Your pulse. (If your heart is beating fast, it may be hard to count your pulse.)  ¨ What you were doing when the palpitations started. ¨ How long the palpitations lasted. ¨ Any other symptoms. · If an activity causes palpitations, slow down or stop. Talk to your doctor before you do that activity again. · Take your medicines exactly as prescribed.  Call your doctor if you think you are having a problem with your medicine. When should you call for help? Call 911 anytime you think you may need emergency care. For example, call if:  ? · You passed out (lost consciousness). ? · You have symptoms of a heart attack. These may include:  ¨ Chest pain or pressure, or a strange feeling in the chest.  ¨ Sweating. ¨ Shortness of breath. ¨ Pain, pressure, or a strange feeling in the back, neck, jaw, or upper belly or in one or both shoulders or arms. ¨ Lightheadedness or sudden weakness. ¨ A fast or irregular heartbeat. After you call 911, the  may tell you to chew 1 adult-strength or 2 to 4 low-dose aspirin. Wait for an ambulance. Do not try to drive yourself. ? · You have symptoms of a stroke. These may include:  ¨ Sudden numbness, tingling, weakness, or loss of movement in your face, arm, or leg, especially on only one side of your body. ¨ Sudden vision changes. ¨ Sudden trouble speaking. ¨ Sudden confusion or trouble understanding simple statements. ¨ Sudden problems with walking or balance. ¨ A sudden, severe headache that is different from past headaches. ?Call your doctor now or seek immediate medical care if:  ? · You have heart palpitations and:  ¨ Are dizzy or lightheaded, or you feel like you may faint. ¨ Have new or increased shortness of breath. ? Watch closely for changes in your health, and be sure to contact your doctor if:  ? · You continue to have heart palpitations. Where can you learn more? Go to http://sussy-warren.info/. Enter R508 in the search box to learn more about \"Palpitations: Care Instructions. \"  Current as of: September 21, 2016  Content Version: 11.4  © 4347-8422 [employer]. Care instructions adapted under license by [employer] (which disclaims liability or warranty for this information).  If you have questions about a medical condition or this instruction, always ask your healthcare professional. [facility] [address] any warranty or liability for your use of this information. Come back for fasting labs, lab opens 8 am, mon-fri. No appt needed. Work on cutting back on amount of caffeine that you drink.   Start 81 mg aspirin each morning with breakfast.

## 2018-07-09 ENCOUNTER — OFFICE VISIT (OUTPATIENT)
Dept: CARDIOLOGY CLINIC | Age: 48
End: 2018-07-09

## 2018-07-09 ENCOUNTER — TELEPHONE (OUTPATIENT)
Dept: INTERNAL MEDICINE CLINIC | Age: 48
End: 2018-07-09

## 2018-07-09 VITALS
RESPIRATION RATE: 16 BRPM | OXYGEN SATURATION: 98 % | WEIGHT: 282.6 LBS | BODY MASS INDEX: 44.36 KG/M2 | HEART RATE: 90 BPM | DIASTOLIC BLOOD PRESSURE: 80 MMHG | SYSTOLIC BLOOD PRESSURE: 130 MMHG | HEIGHT: 67 IN

## 2018-07-09 DIAGNOSIS — E11.65 TYPE 2 DIABETES MELLITUS WITH HYPERGLYCEMIA, WITHOUT LONG-TERM CURRENT USE OF INSULIN (HCC): Chronic | ICD-10-CM

## 2018-07-09 DIAGNOSIS — E66.01 OBESITY, MORBID (HCC): ICD-10-CM

## 2018-07-09 DIAGNOSIS — R39.12 BENIGN PROSTATIC HYPERPLASIA WITH WEAK URINARY STREAM: Chronic | ICD-10-CM

## 2018-07-09 DIAGNOSIS — E11.69 HYPERLIPIDEMIA ASSOCIATED WITH TYPE 2 DIABETES MELLITUS (HCC): Chronic | ICD-10-CM

## 2018-07-09 DIAGNOSIS — E78.5 HYPERLIPIDEMIA ASSOCIATED WITH TYPE 2 DIABETES MELLITUS (HCC): Chronic | ICD-10-CM

## 2018-07-09 DIAGNOSIS — R00.2 PALPITATION: Primary | ICD-10-CM

## 2018-07-09 DIAGNOSIS — N40.1 BENIGN PROSTATIC HYPERPLASIA WITH WEAK URINARY STREAM: Chronic | ICD-10-CM

## 2018-07-09 DIAGNOSIS — F17.200 TOBACCO USE DISORDER: ICD-10-CM

## 2018-07-09 DIAGNOSIS — Z76.89 ENCOUNTER TO ESTABLISH CARE: ICD-10-CM

## 2018-07-09 DIAGNOSIS — I10 ESSENTIAL HYPERTENSION: Chronic | ICD-10-CM

## 2018-07-09 NOTE — PROGRESS NOTES
Patient C/O stressful conditions  at work a few months ago with heart flutters this has not occurred in last couple months. He is not taking medications as requested.

## 2018-07-09 NOTE — PROGRESS NOTES
[address] [place] [address], [address] [facility]  [phone]     Subjective:      Montse Hoffman is a 53 y.o. male with pmhx HTN, HLD, DM, current 0.75 PPD smoker is here to establish care and further evaluation of \"heart flutters\" which started 2 mos ago. States he was under a lot of stress d/t work. He also reports associated tight like, dull h/a. But both symptoms had not occurred  since the week of June 25th. The patient denies chest pain/ shortness of breath, orthopnea, PND, LE edema, syncope, or presyncope.        Cardiac risk factors: HTN, HLD, DM, M, elevated BMI, sedentary lifestyle, fam hx heart disease     Hx smoking: current  Alcohol: denies   Illegal drug use: denies    Family hx: mother: cancer  father: unknown  Siblings: ok   Grandmother had CABG    Patient Active Problem List    Diagnosis Date Noted    Obesity, morbid ([address] 06/18/2018    Hyperlipidemia associated with type 2 diabetes mellitus ([address] 06/18/2018    Essential hypertension 06/18/2018    Benign prostatic hyperplasia with weak urinary stream 06/18/2018    Type 2 diabetes mellitus with hyperglycemia ([address] 10/18/2016    Psoriasis 10/18/2016      Дмитрий Frausto III, DO  Past Medical History:   Diagnosis Date    Diabetes ([address]     on metformin for 6 months    Excessive daytime sleepiness     probable sleep apnea    H/O abscess of skin and subcutaneous tissue     Obesity     Psoriasis       Past Surgical History:   Procedure Laterality Date    HX CYST INCISION AND DRAINAGE Left 09/14/2016    left perineal abscess    HX OTHER SURGICAL      I & D of perineal abscess 9/14/16     Allergies   Allergen Reactions    Lipitor [Atorvastatin] Myalgia     myalgias      Family History   Problem Relation Age of Onset    Cancer Mother      lung    No Known Problems Father     Diabetes Maternal Grandmother     Heart Disease Maternal Grandfather       Social History     Social History    Marital status:  Spouse name: N/A    Number of children: N/A    Years of education: N/A     Occupational History    Not on file. Social History Main Topics    Smoking status: Current Every Day Smoker     Packs/day: 0.75     Years: 30.00     Types: Cigarettes    Smokeless tobacco: Never Used    Alcohol use No    Drug use: Yes     Special: Prescription, OTC    Sexual activity: Yes     Partners: Female     Birth control/ protection: None     Other Topics Concern    Not on file     Social History Narrative      Current Outpatient Prescriptions   Medication Sig    aspirin delayed-release 81 mg tablet Take 1 Tab by mouth daily. (Patient taking differently: Take 81 mg by mouth as needed.)    metFORMIN (GLUCOPHAGE) 500 mg tablet Take 1 Tab by mouth two (2) times daily (with meals). Indications: takes 2 tablets twice daily    ibuprofen (MOTRIN) 600 mg tablet Take 1 Tab by mouth three (3) times daily (with meals).  pravastatin (PRAVACHOL) 20 mg tablet Take 1 Tab by mouth nightly. (Patient not taking: Reported on 7/9/2018)     No current facility-administered medications for this visit. Review of Symptoms:  11 systems reviewed, negative other than as stated in the HPI    Physical ExamPhysical Exam:    Vitals:    07/09/18 1101 07/09/18 1102   BP: 120/80 130/80   Pulse: 90    Resp: 16    SpO2: 98%    Weight: 282 lb 9.6 oz (128.2 kg)    Height: 5' 7\" (1.702 m)      Body mass index is 44.26 kg/(m^2). General PE   Gen:  NAD  Mental Status - Alert. General Appearance - Not in acute distress. Chest and Lung Exam   Inspection: Accessory muscles - No use of accessory muscles in breathing. Auscultation:   Breath sounds: - Normal.   Cardiovascular   Inspection: Jugular vein - Bilateral - Inspection Normal.   Palpation/Percussion:   Apical Impulse: - Normal.   Auscultation: Rhythm - Regular. Heart Sounds - S1 WNL and S2 WNL. No S3 or S4. Murmurs & Other Heart Sounds: Auscultation of the heart reveals - No Murmurs. Peripheral Vascular   Upper Extremity: Inspection - Bilateral - No Cyanotic nailbeds or Digital clubbing. Lower Extremity:   Palpation: Edema - Bilateral - No edema. Abdomen:   Soft, non-tender, bowel sounds are active. Neuro: A&O times 3, CN and motor grossly WNL    Labs:   Lab Results   Component Value Date/Time    Cholesterol, total 189 10/27/2016 07:58 AM    HDL Cholesterol 34 (L) 10/27/2016 07:58 AM    LDL, calculated 112 (H) 10/27/2016 07:58 AM    Triglyceride 216 (H) 10/27/2016 07:58 AM     Lab Results   Component Value Date/Time    CK 70 09/14/2016 01:39 PM     Lab Results   Component Value Date/Time    Sodium 138 09/19/2016 03:10 AM    Potassium 3.6 09/19/2016 03:10 AM    Chloride 104 09/19/2016 03:10 AM    CO2 26 09/19/2016 03:10 AM    Anion gap 8 09/19/2016 03:10 AM    Glucose 165 (H) 09/19/2016 03:10 AM    BUN 12 09/19/2016 03:10 AM    Creatinine 0.77 09/19/2016 03:10 AM    BUN/Creatinine ratio 16 09/19/2016 03:10 AM    GFR est AA >60 09/19/2016 03:10 AM    GFR est non-AA >60 09/19/2016 03:10 AM    Calcium 8.4 (L) 09/19/2016 03:10 AM    Bilirubin, total 0.9 09/14/2016 01:39 PM    AST (SGOT) 8 (L) 09/14/2016 01:39 PM    Alk. phosphatase 91 09/14/2016 01:39 PM    Protein, total 8.2 09/14/2016 01:39 PM    Albumin 4.0 09/14/2016 01:39 PM    Globulin 4.2 (H) 09/14/2016 01:39 PM    A-G Ratio 1.0 (L) 09/14/2016 01:39 PM    ALT (SGPT) 28 09/14/2016 01:39 PM       EKG:  NSR      Assessment:     Assessment:      1. Palpitation    2. Encounter to establish care    3. Hyperlipidemia associated with type 2 diabetes mellitus ([facility]    4. Essential hypertension    5. Benign prostatic hyperplasia with weak urinary stream    6.  Type 2 diabetes mellitus with hyperglycemia, without long-term current use of insulin (HCC)    7. Obesity, morbid ([address]        Orders Placed This Encounter    AMB POC EKG ROUTINE W/ 12 LEADS, INTER & REP     Order Specific Question:   Reason for Exam:     Answer:   routine    2D ECHO COMPLETE ADULT (TTE) W OR WO CONTR     Order Specific Question:   Reason for Exam:     Answer:   palp     Order Specific Question:   Contrast Enhancement (Bubble Study, Definity, Optison) may be used if criteria listed in established evidence-based protocol has been identified. Answer:   Yes        Plan: This is a 53 y.o. male with pmhx HTN, HLD, DM, current 0.75 PPD smoker is here to establish care and further evaluation of \"heart flutters\" which started 2 mos ago. States he was under a lot of stress d/t work. He also reports associated tight like, dull h/a. But both symptoms had not occurred  since the week of June 25th. Cardiac risk factors: HTN, HLD, DM, M, elevated BMI, sedentary lifestyle, fam hx heart disease     Palpitation  Denies excessive caffeine, stimulant intake. PCP already ordered TSH/CMP/CBC  Last episode was two weeks ago. Will set for event monitor if his symptoms recur. He will call our office. Reports snoring,day time sleepiness. He will call PCP to refer him to sleep as they have already discussed this during f/u visit. Obtain baseline echocardiogram  He will call for 2 week event monitor if more palpitations    HTN  Controlled    HLD  10/16 LDL at 112. On Pravastatin but NOT taking it because he developed myalgia with Lipitor. Lipids and labs followed by PCP. PCP already ordered labs for this year    DM  On Metformin therapy    Suspected BRAD:  Reportedly planning evaluation via PCP. BRAD and obesity is a synergistic combination that can lead to atrial dysrhythmias. Current smoker  Tobacco cessation provided- 5 min    Morbid obesity:  Counseled on diet and exercise- eventual goal of 30-60 minutes 5-7 times a week as per AHA guidelines. Goal of 10% (28 lbs) weight loss for 6 months. Continue current care and f/u in 6 months.     Jorge Castellano MD

## 2018-07-09 NOTE — MR AVS SNAPSHOT
[address] [facility] 
[phone] Patient: Sondra Reese MRN: [id number] VX Visit Information Date & Time Provider Department Dept. Phone Encounter #  
 2018 10:30 AM Mary Kay Fuentes, [address] [place] [address] [facility] [phone] [id number] Your Appointments 2018 12:30 PM  
ECHO CARDIOGRAMS 2D with [address] [facility] Appt Note: Dr Michelle Connors (TTE) W OR WO CONTR [[id number]] (Order [id number])  
 [address] [facility]  
[phone] [address] [facility]  
  
    
 2019  1:45 PM  
ESTABLISHED PATIENT with Mary Kay Fuentes MD  
[facility]) Appt Note: 6 month f/u  
 [address] [facility]  
[phone] [address] [facility] Upcoming Health Maintenance Date Due  
 EYE EXAM RETINAL OR DILATED Q1 1981 HEMOGLOBIN A1C Q6M 2017 MICROALBUMIN Q1 10/27/2017 LIPID PANEL Q1 10/27/2017 FOOT EXAM Q1 2018 Influenza Age 5 to Adult 2018 DTaP/Tdap/Td series (2 - Td) 10/18/2026 Allergies as of 2018  Review Complete On: 2018 By: Mary Kay Fuentes MD  
  
 Severity Noted Reaction Type Reactions Lipitor [Atorvastatin]  2017   Side Effect Myalgia  
 myalgias Current Immunizations  Reviewed on 10/18/2016 Name Date Tdap 10/18/2016  4:26 PM  
  
 Not reviewed this visit You Were Diagnosed With   
  
 Codes Comments Palpitation    -  Primary ICD-10-CM: R00.2 ICD-9-CM: 785.1 Encounter to establish care     ICD-10-CM: Z76.89 
ICD-9-CM: V65.8 Hyperlipidemia associated with type 2 diabetes mellitus ([facility]     ICD-10-CM: E11.69, E78.5 ICD-9-CM: 250.80, 272.4  Essential hypertension     ICD-10-CM: I10 
 ICD-9-CM: 401.9 Benign prostatic hyperplasia with weak urinary stream     ICD-10-CM: N40.1, R39.12 
ICD-9-CM: 600.01, 788.62 Type 2 diabetes mellitus with hyperglycemia, without long-term current use of insulin (HCC)     ICD-10-CM: E11.65 ICD-9-CM: 250.00, 790.29 Obesity, morbid ([facility]     ICD-10-CM: E66.01 
ICD-9-CM: 278.01 Vitals BP Pulse Resp Height(growth percentile) Weight(growth percentile) SpO2  
 130/80 (BP 1 Location: Right arm, BP Patient Position: Sitting) 90 16 5' 7\" (1.702 m) 282 lb 9.6 oz (128.2 kg) 98% BMI Smoking Status 44.26 kg/m2 Current Every Day Smoker Vitals History BMI and BSA Data Body Mass Index Body Surface Area  
 44.26 kg/m 2 2.46 m 2 Preferred Pharmacy Pharmacy Name Phone [id number] [facility] [phone] Your Updated Medication List  
  
   
This list is accurate as of 7/9/18 11:46 AM.  Always use your most recent med list.  
  
  
  
  
 aspirin delayed-release 81 mg tablet Take 1 Tab by mouth daily. ibuprofen 600 mg tablet Commonly known as:  MOTRIN Take 1 Tab by mouth three (3) times daily (with meals). metFORMIN 500 mg tablet Commonly known as:  GLUCOPHAGE Take 1 Tab by mouth two (2) times daily (with meals). Indications: takes 2 tablets twice daily  
  
 pravastatin 20 mg tablet Commonly known as:  PRAVACHOL Take 1 Tab by mouth nightly. We Performed the Following 2D ECHO COMPLETE ADULT (TTE) W OR WO CONTR [60500 CPT(R)] AMB POC EKG ROUTINE W/ 12 LEADS, INTER & REP [25004 CPT(R)] Introducing hospitals & HEALTH SERVICES! [address] introduces [employer] patient portal. Now you can access parts of your medical record, email your doctor's office, and request medication refills online. 1. In your internet browser, go to [email or web address] 2. Click on the First Time User? Click Here link in the Sign In box. You will see the New Member Sign Up page. 3. Enter your [employer] Access Code exactly as it appears below. You will not need to use this code after youve completed the sign-up process. If you do not sign up before the expiration date, you must request a new code. · [employer] Access Code: [email or web address] Expires: 9/16/2018  3:50 PM 
 
4. Enter the last four digits of your Social Security Number (xxxx) and Date of Birth (mm/dd/yyyy) as indicated and click Submit. You will be taken to the next sign-up page. 5. Create a [employer] ID. This will be your [employer] login ID and cannot be changed, so think of one that is secure and easy to remember. 6. Create a [employer] password. You can change your password at any time. 7. Enter your Password Reset Question and Answer. This can be used at a later time if you forget your password. 8. Enter your e-mail address. You will receive e-mail notification when new information is available in [address] 9. Click Sign Up. You can now view and download portions of your medical record. 10. Click the Download Summary menu link to download a portable copy of your medical information. If you have questions, please visit the Frequently Asked Questions section of the [employer] website. Remember, [employer] is NOT to be used for urgent needs. For medical emergencies, dial 911. Now available from your iPhone and Android! Please provide this summary of care documentation to your next provider. Your primary care clinician is listed as Yuli Woodard If you have any questions after today's visit, please call [phone].

## 2018-07-09 NOTE — TELEPHONE ENCOUNTER
#[phone] pt needs to know what sleep apnea doctor you referred him to? Please call as I didn't see in chart. Thanks.

## 2018-07-10 NOTE — TELEPHONE ENCOUNTER
Pt is returning a call from the practice. Best contact number is [phone].          Message received & copied from [facility] Maximus irais after closing on 7/9/18

## 2018-07-13 NOTE — TELEPHONE ENCOUNTER
Attempted to call patient but the there was no answer and th voicemail is not set up.  Closing message at this time

## 2018-07-17 ENCOUNTER — CLINICAL SUPPORT (OUTPATIENT)
Dept: CARDIOLOGY CLINIC | Age: 48
End: 2018-07-17

## 2018-07-17 DIAGNOSIS — R00.2 PALPITATIONS: Primary | ICD-10-CM

## 2018-07-26 NOTE — PROGRESS NOTES
Please advise echo essentially normal.  Call if he needs a 2 week event monitor for palpitations, otherwise follow-up in 6 months.

## 2018-07-27 ENCOUNTER — TELEPHONE (OUTPATIENT)
Dept: CARDIOLOGY CLINIC | Age: 48
End: 2018-07-27

## 2018-07-27 NOTE — TELEPHONE ENCOUNTER
----- Message from Ingris Drew MD sent at 7/26/2018  5:26 PM EDT -----  Please advise echo essentially normal.  Call if he needs a 2 week event monitor for palpitations, otherwise follow-up in 6 months.

## 2019-12-24 ENCOUNTER — HOSPITAL ENCOUNTER (EMERGENCY)
Age: 49
Discharge: HOME | End: 2019-12-24
Attending: EMERGENCY MEDICINE
Payer: SELF-PAY

## 2019-12-24 ENCOUNTER — APPOINTMENT (OUTPATIENT)
Dept: CT IMAGING | Age: 49
End: 2019-12-24
Attending: EMERGENCY MEDICINE
Payer: SELF-PAY

## 2019-12-24 VITALS
HEART RATE: 88 BPM | RESPIRATION RATE: 14 BRPM | TEMPERATURE: 98.1 F | OXYGEN SATURATION: 96 % | DIASTOLIC BLOOD PRESSURE: 80 MMHG | WEIGHT: 279.1 LBS | HEIGHT: 72 IN | SYSTOLIC BLOOD PRESSURE: 125 MMHG | BODY MASS INDEX: 37.8 KG/M2

## 2019-12-24 DIAGNOSIS — R73.9 HYPERGLYCEMIA: ICD-10-CM

## 2019-12-24 DIAGNOSIS — L03.211 FACIAL CELLULITIS: Primary | ICD-10-CM

## 2019-12-24 LAB
ALBUMIN SERPL-MCNC: 3.5 G/DL (ref 3.5–5)
ALBUMIN/GLOB SERPL: 1.1 {RATIO} (ref 1.1–2.2)
ALP SERPL-CCNC: 85 U/L (ref 45–117)
ALT SERPL-CCNC: 52 U/L (ref 12–78)
ANION GAP SERPL CALC-SCNC: 4 MMOL/L (ref 5–15)
AST SERPL-CCNC: 20 U/L (ref 15–37)
BASOPHILS # BLD: 0 K/UL (ref 0–0.1)
BASOPHILS NFR BLD: 1 % (ref 0–1)
BILIRUB SERPL-MCNC: 0.3 MG/DL (ref 0.2–1)
BUN SERPL-MCNC: 12 MG/DL (ref 6–20)
BUN/CREAT SERPL: 14 (ref 12–20)
CALCIUM SERPL-MCNC: 8.6 MG/DL (ref 8.5–10.1)
CHLORIDE SERPL-SCNC: 103 MMOL/L (ref 97–108)
CO2 SERPL-SCNC: 28 MMOL/L (ref 21–32)
CREAT SERPL-MCNC: 0.83 MG/DL (ref 0.7–1.3)
DIFFERENTIAL METHOD BLD: ABNORMAL
EOSINOPHIL # BLD: 0.2 K/UL (ref 0–0.4)
EOSINOPHIL NFR BLD: 2 % (ref 0–7)
ERYTHROCYTE [DISTWIDTH] IN BLOOD BY AUTOMATED COUNT: 12.3 % (ref 11.5–14.5)
GLOBULIN SER CALC-MCNC: 3.3 G/DL (ref 2–4)
GLUCOSE BLD STRIP.AUTO-MCNC: 174 MG/DL (ref 65–100)
GLUCOSE SERPL-MCNC: 360 MG/DL (ref 65–100)
HCT VFR BLD AUTO: 45.2 % (ref 36.6–50.3)
HGB BLD-MCNC: 15.4 G/DL (ref 12.1–17)
IMM GRANULOCYTES # BLD AUTO: 0.1 K/UL (ref 0–0.04)
IMM GRANULOCYTES NFR BLD AUTO: 1 % (ref 0–0.5)
LACTATE BLD-SCNC: 1.33 MMOL/L (ref 0.4–2)
LYMPHOCYTES # BLD: 1.4 K/UL (ref 0.8–3.5)
LYMPHOCYTES NFR BLD: 18 % (ref 12–49)
MCH RBC QN AUTO: 27.9 PG (ref 26–34)
MCHC RBC AUTO-ENTMCNC: 34.1 G/DL (ref 30–36.5)
MCV RBC AUTO: 82 FL (ref 80–99)
MONOCYTES # BLD: 0.7 K/UL (ref 0–1)
MONOCYTES NFR BLD: 9 % (ref 5–13)
NEUTS SEG # BLD: 5.2 K/UL (ref 1.8–8)
NEUTS SEG NFR BLD: 69 % (ref 32–75)
NRBC # BLD: 0 K/UL (ref 0–0.01)
NRBC BLD-RTO: 0 PER 100 WBC
PLATELET # BLD AUTO: 214 K/UL (ref 150–400)
PMV BLD AUTO: 10 FL (ref 8.9–12.9)
POTASSIUM SERPL-SCNC: 3.8 MMOL/L (ref 3.5–5.1)
PROT SERPL-MCNC: 6.8 G/DL (ref 6.4–8.2)
RBC # BLD AUTO: 5.51 M/UL (ref 4.1–5.7)
SERVICE CMNT-IMP: ABNORMAL
SODIUM SERPL-SCNC: 135 MMOL/L (ref 136–145)
WBC # BLD AUTO: 7.5 K/UL (ref 4.1–11.1)

## 2019-12-24 PROCEDURE — 87040 BLOOD CULTURE FOR BACTERIA: CPT

## 2019-12-24 PROCEDURE — 96375 TX/PRO/DX INJ NEW DRUG ADDON: CPT

## 2019-12-24 PROCEDURE — 80053 COMPREHEN METABOLIC PANEL: CPT

## 2019-12-24 PROCEDURE — 70487 CT MAXILLOFACIAL W/DYE: CPT

## 2019-12-24 PROCEDURE — 82962 GLUCOSE BLOOD TEST: CPT

## 2019-12-24 PROCEDURE — 74011250636 HC RX REV CODE- 250/636: Performed by: EMERGENCY MEDICINE

## 2019-12-24 PROCEDURE — 70450 CT HEAD/BRAIN W/O DYE: CPT

## 2019-12-24 PROCEDURE — 36415 COLL VENOUS BLD VENIPUNCTURE: CPT

## 2019-12-24 PROCEDURE — 83605 ASSAY OF LACTIC ACID: CPT

## 2019-12-24 PROCEDURE — 74011636320 HC RX REV CODE- 636/320: Performed by: EMERGENCY MEDICINE

## 2019-12-24 PROCEDURE — 99284 EMERGENCY DEPT VISIT MOD MDM: CPT

## 2019-12-24 PROCEDURE — 74011000250 HC RX REV CODE- 250: Performed by: EMERGENCY MEDICINE

## 2019-12-24 PROCEDURE — 96374 THER/PROPH/DIAG INJ IV PUSH: CPT

## 2019-12-24 PROCEDURE — 85025 COMPLETE CBC W/AUTO DIFF WBC: CPT

## 2019-12-24 PROCEDURE — 74011636637 HC RX REV CODE- 636/637: Performed by: EMERGENCY MEDICINE

## 2019-12-24 RX ORDER — CLINDAMYCIN PHOSPHATE 600 MG/50ML
600 INJECTION INTRAVENOUS
Status: COMPLETED | OUTPATIENT
Start: 2019-12-24 | End: 2019-12-24

## 2019-12-24 RX ORDER — DIPHENHYDRAMINE HYDROCHLORIDE 50 MG/ML
25 INJECTION, SOLUTION INTRAMUSCULAR; INTRAVENOUS
Status: COMPLETED | OUTPATIENT
Start: 2019-12-24 | End: 2019-12-24

## 2019-12-24 RX ORDER — DEXAMETHASONE SODIUM PHOSPHATE 4 MG/ML
10 INJECTION, SOLUTION INTRA-ARTICULAR; INTRALESIONAL; INTRAMUSCULAR; INTRAVENOUS; SOFT TISSUE
Status: COMPLETED | OUTPATIENT
Start: 2019-12-24 | End: 2019-12-24

## 2019-12-24 RX ORDER — SODIUM CHLORIDE 0.9 % (FLUSH) 0.9 %
10 SYRINGE (ML) INJECTION
Status: COMPLETED | OUTPATIENT
Start: 2019-12-24 | End: 2019-12-24

## 2019-12-24 RX ORDER — CLINDAMYCIN HYDROCHLORIDE 300 MG/1
300 CAPSULE ORAL 4 TIMES DAILY
Qty: 40 CAP | Refills: 0 | Status: SHIPPED | OUTPATIENT
Start: 2019-12-24

## 2019-12-24 RX ADMIN — CLINDAMYCIN PHOSPHATE 600 MG: 600 INJECTION, SOLUTION INTRAVENOUS at 20:27

## 2019-12-24 RX ADMIN — HUMAN INSULIN 3 UNITS: 100 INJECTION, SOLUTION SUBCUTANEOUS at 17:56

## 2019-12-24 RX ADMIN — DIPHENHYDRAMINE HYDROCHLORIDE 25 MG: 50 INJECTION, SOLUTION INTRAMUSCULAR; INTRAVENOUS at 17:15

## 2019-12-24 RX ADMIN — DEXAMETHASONE SODIUM PHOSPHATE 10 MG: 4 INJECTION, SOLUTION INTRAMUSCULAR; INTRAVENOUS at 17:15

## 2019-12-24 RX ADMIN — FAMOTIDINE 20 MG: 10 INJECTION, SOLUTION INTRAVENOUS at 17:15

## 2019-12-24 RX ADMIN — IOPAMIDOL 100 ML: 755 INJECTION, SOLUTION INTRAVENOUS at 19:09

## 2019-12-24 RX ADMIN — Medication 10 ML: at 19:09

## 2019-12-24 NOTE — ED PROVIDER NOTES
EMERGENCY DEPARTMENT HISTORY AND PHYSICAL EXAM      Date: 12/24/2019  Patient Name: Vanessa Henderson    History of Presenting Illness     Chief Complaint   Patient presents with    Skin Problem     Ambulatory into the ED with c/o progressive swelling and redness around his Rt eye x 12/21. Denies injury and pain. History Provided By: Patient    HPI: Vanessa Henderson, 51 y.o. male presents to the ED with cc of left facial swelling. The patient noticed a rash from psoriasis on his left frontal region 2 days ago. Since then, he has had increased swelling to the left side of his face. He denies pruritus and states that he has some pain involving his forehead secondary to trying to keep his left eyelid open. He has left periorbital edema and some edema involving the mandibular region on the left. He states that his ear was hurting yesterday, but denies any currently. He denies fever or chills. He denies throat tightness, chest pain or shortness of breath. There are no other complaints, changes, or physical findings at this time. Smoking cessation note: The patient was encouraged to quit smoking. PCP: Elio Mcrae, DO    No current facility-administered medications on file prior to encounter. Current Outpatient Medications on File Prior to Encounter   Medication Sig Dispense Refill    aspirin delayed-release 81 mg tablet Take 1 Tab by mouth daily. (Patient taking differently: Take 81 mg by mouth as needed.) 90 Tab 3    metFORMIN (GLUCOPHAGE) 500 mg tablet Take 1 Tab by mouth two (2) times daily (with meals). Indications: takes 2 tablets twice daily 180 Tab 3    pravastatin (PRAVACHOL) 20 mg tablet Take 1 Tab by mouth nightly. (Patient not taking: Reported on 7/9/2018) 30 Tab 6    ibuprofen (MOTRIN) 600 mg tablet Take 1 Tab by mouth three (3) times daily (with meals).  21 Tab 0       Past History     Past Medical History:  Past Medical History:   Diagnosis Date    Diabetes ([address] on metformin for 6 months    Excessive daytime sleepiness     probable sleep apnea    H/O abscess of skin and subcutaneous tissue     Obesity     Psoriasis        Past Surgical History:  Past Surgical History:   Procedure Laterality Date    HX CYST INCISION AND DRAINAGE Left 09/14/2016    left perineal abscess    HX OTHER SURGICAL      I & D of perineal abscess 9/14/16       Family History:  Family History   Problem Relation Age of Onset    Cancer Mother         lung    No Known Problems Father     Diabetes Maternal Grandmother     Heart Disease Maternal Grandfather        Social History:  Social History     Tobacco Use    Smoking status: Current Every Day Smoker     Packs/day: 0.75     Years: 30.00     Pack years: 22.50     Types: Cigarettes    Smokeless tobacco: Never Used   Substance Use Topics    Alcohol use: No    Drug use: Yes     Types: Prescription, OTC       Allergies: Allergies   Allergen Reactions    Lipitor [Atorvastatin] Myalgia     myalgias         Review of Systems   Review of Systems   Constitutional: Negative for chills and fever. HENT: Negative for congestion. Eyes: Negative. Negative for visual disturbance. Respiratory: Negative for cough. Cardiovascular: Negative for chest pain. Gastrointestinal: Negative for abdominal pain. Endocrine: Negative for heat intolerance. Genitourinary: Negative. Musculoskeletal: Negative for back pain. Skin: Positive for rash. Allergic/Immunologic: Negative for immunocompromised state. Neurological: Negative for dizziness. Hematological: Does not bruise/bleed easily. Psychiatric/Behavioral: Negative. All other systems reviewed and are negative. Physical Exam   Physical Exam  Vitals signs and nursing note reviewed. Constitutional:       General: He is not in acute distress. Appearance: He is well-developed. HENT:      Head: Normocephalic and atraumatic.       Comments: Left facial and periorbital edema, mild erythema  Eyes:      Extraocular Movements: Extraocular movements intact. Pupils: Pupils are equal, round, and reactive to light. Neck:      Musculoskeletal: Normal range of motion and neck supple. Cardiovascular:      Rate and Rhythm: Regular rhythm. Tachycardia present. Heart sounds: Normal heart sounds. Pulmonary:      Effort: Pulmonary effort is normal.      Breath sounds: Normal breath sounds. No wheezing. Abdominal:      General: Bowel sounds are normal.      Palpations: Abdomen is soft. Tenderness: There is no tenderness. Musculoskeletal: Normal range of motion. General: No tenderness. Skin:     General: Skin is warm and dry. Findings: Rash present. Comments: Psoriasis, left frontal   Neurological:      General: No focal deficit present. Mental Status: He is alert and oriented to person, place, and time. Cranial Nerves: No cranial nerve deficit. Psychiatric:         Mood and Affect: Mood normal.         Behavior: Behavior normal.         Diagnostic Study Results     Labs -   No results found for this or any previous visit (from the past 12 hour(s)). Radiologic Studies -   CT HEAD WO CONT   Final Result   IMPRESSION: Scalp soft tissue swelling. No acute intracranial findings. CT MAXILLOFACIAL W CONT   Final Result   IMPRESSION: Findings are most compatible with facial cellulitis. A fluid   collection to suggest abscess is not identified. CT Results  (Last 48 hours)    None        CXR Results  (Last 48 hours)    None          Medical Decision Making   I am the first provider for this patient. I reviewed the vital signs, available nursing notes, past medical history, past surgical history, family history and social history. Vital Signs-Reviewed the patient's vital signs.   Patient Vitals for the past 12 hrs:   Temp Pulse Resp BP SpO2   12/24/19 1529 98 °F (36.7 °C) (!) 113 12 150/50 98 %           Records Reviewed: Nursing Notes, Old Medical Records, Previous Radiology Studies and Previous Laboratory Studies    Provider Notes (Medical Decision Making):   Cellulitis, allergic reaction, periorbital cellulitis, psoriasis    ED Course:   Initial assessment performed. The patients presenting problems have been discussed, and they are in agreement with the care plan formulated and outlined with them. I have encouraged them to ask questions as they arise throughout their visit. Progress note: The patient's results were reviewed. The patient is feeling better. He is advised to follow-up and return to ER if worse         Critical Care Time:   0    Disposition:  home    PLAN:  1. Discharge Medication List as of 12/24/2019  8:20 PM      START taking these medications    Details   clindamycin (CLEOCIN) 300 mg capsule Take 1 Cap by mouth four (4) times daily. , Normal, Disp-40 Cap, R-0         CONTINUE these medications which have NOT CHANGED    Details   aspirin delayed-release 81 mg tablet Take 1 Tab by mouth daily. , No Print, Disp-90 Tab, R-3      metFORMIN (GLUCOPHAGE) 500 mg tablet Take 1 Tab by mouth two (2) times daily (with meals). Indications: takes 2 tablets twice daily, Normal, Disp-180 Tab, R-3      pravastatin (PRAVACHOL) 20 mg tablet Take 1 Tab by mouth nightly., Normal, Disp-30 Tab, R-6      ibuprofen (MOTRIN) 600 mg tablet Take 1 Tab by mouth three (3) times daily (with meals). , Print, Disp-21 Tab, R-0           2. Follow-up Information     Follow up With Specialties Details Why Contact Info    Gael Kaplan, DO Internal Medicine In 2 days  [address] [facility]  [phone]      [facility] EMERGENCY DEPT Emergency Medicine  If symptoms worsen [address]  [address] [facility]  [phone]        Return to ED if worse     Diagnosis     Clinical Impression:   1. Facial cellulitis    2.  Hyperglycemia        Attestations:    Roosevelt Momin MD    Please note that this dictation was completed with SHADOW, the computer voice recognition software. Quite often unanticipated grammatical, syntax, homophones, and other interpretive errors are inadvertently transcribed by the computer software. Please disregard these errors. Please excuse any errors that have escaped final proofreading. Thank you.

## 2019-12-25 NOTE — ED NOTES
Bedside and Verbal shift change report given to [facility] RN (oncoming nurse) by Tk Chu (offgoing nurse). Report included the following information SBAR, ED Summary, MAR and Recent Results.

## 2019-12-25 NOTE — DISCHARGE INSTRUCTIONS
Patient Education        Cellulitis: Care Instructions  Your Care Instructions    Cellulitis is a skin infection caused by bacteria, most often strep or staph. It often occurs after a break in the skin from a scrape, cut, bite, or puncture, or after a rash. Cellulitis may be treated without doing tests to find out what caused it. But your doctor may do tests, if needed, to look for a specific bacteria, like methicillin-resistant Staphylococcus aureus (MRSA). The doctor has checked you carefully, but problems can develop later. If you notice any problems or new symptoms, get medical treatment right away. Follow-up care is a key part of your treatment and safety. Be sure to make and go to all appointments, and call your doctor if you are having problems. It's also a good idea to know your test results and keep a list of the medicines you take. How can you care for yourself at home? · Take your antibiotics as directed. Do not stop taking them just because you feel better. You need to take the full course of antibiotics. · Prop up the infected area on pillows to reduce pain and swelling. Try to keep the area above the level of your heart as often as you can. · If your doctor told you how to care for your wound, follow your doctor's instructions. If you did not get instructions, follow this general advice:  ? Wash the wound with clean water 2 times a day. Don't use hydrogen peroxide or alcohol, which can slow healing. ? You may cover the wound with a thin layer of petroleum jelly, such as Vaseline, and a nonstick bandage. ? Apply more petroleum jelly and replace the bandage as needed. · Be safe with medicines. Take pain medicines exactly as directed. ? If the doctor gave you a prescription medicine for pain, take it as prescribed. ? If you are not taking a prescription pain medicine, ask your doctor if you can take an over-the-counter medicine.   To prevent cellulitis in the future  · Try to prevent cuts, scrapes, or other injuries to your skin. Cellulitis most often occurs where there is a break in the skin. · If you get a scrape, cut, mild burn, or bite, wash the wound with clean water as soon as you can to help avoid infection. Don't use hydrogen peroxide or alcohol, which can slow healing. · If you have swelling in your legs (edema), support stockings and good skin care may help prevent leg sores and cellulitis. · Take care of your feet, especially if you have diabetes or other conditions that increase the risk of infection. Wear shoes and socks. Do not go barefoot. If you have athlete's foot or other skin problems on your feet, talk to your doctor about how to treat them. When should you call for help? Call your doctor now or seek immediate medical care if:    · You have signs that your infection is getting worse, such as:  ? Increased pain, swelling, warmth, or redness. ? Red streaks leading from the area. ? Pus draining from the area. ? A fever.     · You get a rash.    Watch closely for changes in your health, and be sure to contact your doctor if:    · You do not get better as expected. Where can you learn more? Go to http://sussy-warren.info/. Karie Hobbs in the search box to learn more about \"Cellulitis: Care Instructions. \"  Current as of: April 1, 2019  Content Version: 12.2  © 6332-8544 [employer]. Care instructions adapted under license by [employer] (which disclaims liability or warranty for this information). If you have questions about a medical condition or this instruction, always ask your healthcare professional. [place] [address] any warranty or liability for your use of this information. Patient Education        Learning About High Blood Sugar  What is high blood sugar? Your body turns the food you eat into glucose (sugar), which it uses for energy.  But if your body isn't able to use the sugar right away, it can build up in your blood and lead to high blood sugar. When the amount of sugar in your blood stays too high for too much of the time, you may have diabetes. Diabetes is a disease that can cause serious health problems. The good news is that lifestyle changes may help you get your blood sugar back to normal and avoid or delay diabetes. What causes high blood sugar? Sugar (glucose) can build up in your blood if you:  · Are overweight. · Have a family history of diabetes. · Take certain medicines, such as steroids. What are the symptoms? Having high blood sugar may not cause any symptoms at all. Or it may make you feel very thirsty or very hungry. You may also urinate more often than usual, have blurry vision, or lose weight without trying. How is high blood sugar treated? You can take steps to lower your blood sugar level if you understand what makes it get higher. Your doctor may want you to learn how to test your blood sugar level at home. Then you can see how illness, stress, or different kinds of food or medicine raise or lower your blood sugar level. Other tests may be needed to see if you have diabetes. How can you prevent high blood sugar? · Watch your weight. If you're overweight, losing just a small amount of weight may help. Reducing fat around your waist is most important. · Limit the amount of calories, sweets, and unhealthy fat you eat. Ask your doctor if a dietitian can help you. A registered dietitian can help you create meal plans that fit your lifestyle. · Get at least 30 minutes of exercise on most days of the week. Exercise helps control your blood sugar. It also helps you maintain a healthy weight. Walking is a good choice. You also may want to do other activities, such as running, swimming, cycling, or playing tennis or team sports. · If your doctor prescribed medicines, take them exactly as prescribed. Call your doctor if you think you are having a problem with your medicine.  You will get more details on the specific medicines your doctor prescribes. Follow-up care is a key part of your treatment and safety. Be sure to make and go to all appointments, and call your doctor if you are having problems. It's also a good idea to know your test results and keep a list of the medicines you take. Where can you learn more? Go to http://sussy-warren.info/. Enter O108 in the search box to learn more about \"Learning About High Blood Sugar. \"  Current as of: April 16, 2019  Content Version: 12.2  © 6140-9630 [employer]. Care instructions adapted under license by [employer] (which disclaims liability or warranty for this information). If you have questions about a medical condition or this instruction, always ask your healthcare professional. [address] any warranty or liability for your use of this information.

## 2019-12-25 NOTE — ED NOTES
Dr. Roshan Nichols reviewed discharge instructions with the patient. The patient verbalized understanding. All questions and concerns were addressed. The patient declined a wheelchair and is discharged ambulatory in the care of family members with instructions and prescriptions in hand. Pt is alert and oriented x 4. Respirations are clear and unlabored.

## 2019-12-29 LAB
BACTERIA SPEC CULT: NORMAL
SERVICE CMNT-IMP: NORMAL